# Patient Record
Sex: FEMALE | URBAN - METROPOLITAN AREA
[De-identification: names, ages, dates, MRNs, and addresses within clinical notes are randomized per-mention and may not be internally consistent; named-entity substitution may affect disease eponyms.]

---

## 2023-01-31 ENCOUNTER — OFFICE VISIT (OUTPATIENT)
Dept: URBAN - METROPOLITAN AREA CLINIC 30 | Facility: CLINIC | Age: 53
End: 2023-01-31
Payer: MEDICAID

## 2023-01-31 DIAGNOSIS — H25.013 CORTICAL AGE-RELATED CATARACT, BILATERAL: Primary | ICD-10-CM

## 2023-01-31 DIAGNOSIS — H52.223 REGULAR ASTIGMATISM, BILATERAL: ICD-10-CM

## 2023-01-31 DIAGNOSIS — I10 ESSENTIAL (PRIMARY) HYPERTENSION: ICD-10-CM

## 2023-01-31 DIAGNOSIS — D32.9 MENINGIOMA: ICD-10-CM

## 2023-01-31 DIAGNOSIS — H43.393 OTHER VITREOUS OPACITIES, BILATERAL: ICD-10-CM

## 2023-01-31 PROCEDURE — 92134 CPTRZ OPH DX IMG PST SGM RTA: CPT | Performed by: OPTOMETRIST

## 2023-01-31 PROCEDURE — 99204 OFFICE O/P NEW MOD 45 MIN: CPT | Performed by: OPTOMETRIST

## 2023-01-31 ASSESSMENT — VISUAL ACUITY
OD: 20/20
OS: 20/20

## 2023-01-31 ASSESSMENT — INTRAOCULAR PRESSURE
OS: 11
OD: 10

## 2023-01-31 ASSESSMENT — KERATOMETRY
OD: 40.44
OS: 40.28

## 2023-01-31 NOTE — IMPRESSION/PLAN
Impression: Meningioma: D32.9. Plan: Right side, per pt. Under the care neurology. Diagnosed in 2016. Most recent MRI was in 2022. Pt will get copy of the reports. Pt notes some changes in [place]- most likely presbyopic in nature, will monitor for now. Pt notes prev hx of botox to help reduce migraines.

## 2023-01-31 NOTE — IMPRESSION/PLAN
Impression: Essential (primary) hypertension: I10. Plan: Pt reports episodes of very high BP. Under the care of her physician.

## 2024-01-30 ENCOUNTER — OFFICE VISIT (OUTPATIENT)
Dept: URBAN - METROPOLITAN AREA CLINIC 30 | Facility: CLINIC | Age: 54
End: 2024-01-30
Payer: MEDICAID

## 2024-01-30 DIAGNOSIS — I10 ESSENTIAL (PRIMARY) HYPERTENSION: Primary | ICD-10-CM

## 2024-01-30 DIAGNOSIS — D32.9 MENINGIOMA: ICD-10-CM

## 2024-01-30 DIAGNOSIS — H52.223 REGULAR ASTIGMATISM, BILATERAL: ICD-10-CM

## 2024-01-30 DIAGNOSIS — H43.393 OTHER VITREOUS OPACITIES, BILATERAL: ICD-10-CM

## 2024-01-30 DIAGNOSIS — H25.013 CORTICAL AGE-RELATED CATARACT, BILATERAL: ICD-10-CM

## 2024-01-30 PROCEDURE — 99213 OFFICE O/P EST LOW 20 MIN: CPT | Performed by: OPTOMETRIST

## 2024-01-30 ASSESSMENT — VISUAL ACUITY
OD: 20/40
OS: 20/25

## 2024-01-30 ASSESSMENT — INTRAOCULAR PRESSURE
OD: 11
OS: 11

## 2024-01-30 ASSESSMENT — KERATOMETRY
OS: 40.27
OD: 40.41